# Patient Record
(demographics unavailable — no encounter records)

---

## 2025-02-10 NOTE — DATA REVIEWED
[FreeTextEntry1] : 2/10/2025 - BLE venous duplex Negative for acute DVT/SVT bilaterally. Bilateral GSV in thigh have been ablated. There are multiple varicose veins in the calves.   4/1/2024 - BLE venous duplex Negative for DVT/SVT bilaterally. Right - Reflux in CFV, GSV measuring 7.9mm at junction, 7.3 mm prox, reflux >0.5 seconds. VV measuring up to 6 mm in size.  Left - Reflux in CFV, GSV measuring 8.1mm at junction, 6.2 mm prox, reflux > 0.5 seconds. VV measuring up to 5.9 mm.

## 2025-02-10 NOTE — PHYSICAL EXAM
[2+] : left 2+ [Ankle Swelling On The Left] : moderate [Varicose Veins Of Lower Extremities] : bilaterally [Ankle Swelling On The Right] : mild [Calm] : calm [Ankle Swelling (On Exam)] : not present [] : not present [de-identified] : Well-appearing  [de-identified] : EOMI, anicteric  [de-identified] : soft, nt, nd  [de-identified] : motor and sensation intact in all four extremities  [de-identified] : no wounds on bilateral feet [de-identified] : A&Ox4

## 2025-02-10 NOTE — HISTORY OF PRESENT ILLNESS
[FreeTextEntry1] : STEVEN DEBRUNNER is a 62 year old male who presents for evaluation of bilateral varicose veins.   Referred by Dr. Bonifacio Kowalski.  Patient states that he has had varicose veins for many years. They are increasing in size and in number. Sometimes, they cause pain. Worse at the end of the day. Also reports leg swelling, heaviness, tightness. The right ankle swells more.  Personal history of DVT - None Family history of DVT - None Family history of venous insufficiency or varicose veins - Grandmother Current compression stocking usage - Yes but with persistent symptoms.  Retired  so stood a lot for work.   + PMH: atrial fibrillation, HTN + PSH: s/p ablations, hernia repair + FH: diabetes + SH: current smoker 1/2 ppd (10 years), 6-12 beer daily, marijuana < 1 joint a day + Aller: NKDA  PCP is Dr. Simba Perez. Cardiologist is Dr. Bonifacio Kowalski.   6/11/2024-left GSV RFA 6/18/2024-right GSV RFA 7/9/2024-left stab phlebectomy 7/16/2024-right stab phlebectomy  8/12/2024 - Pt presents for follow up. Doing well. Mild tenderness along left medial thigh. Denies leg swelling. Denies leg pain otherwise.  [de-identified] : 2/10/2025 - PT presents for follow up. Doing well. Denies leg pain.

## 2025-07-07 NOTE — DISCUSSION/SUMMARY
[FreeTextEntry1] : pAF: S/p redo ablation. Sinus tach in past, borderline elevated HR's. C/w diltiazem and Eliquis.     Strict avoidance of ETOH advised Encouraged smoking cessation Continue Eliquis 5mg BID  HTN- BP improved on recheck, continue meds. Alcohol cessation   Varicosities - Improving with vascular procedures   Consider ischemic testing- no real symptoms- needs to cut back on substance use   RV 6M  [EKG obtained to assist in diagnosis and management of assessed problem(s)] : EKG obtained to assist in diagnosis and management of assessed problem(s)

## 2025-07-07 NOTE — PHYSICAL EXAM
[Well Developed] : well developed [Well Nourished] : well nourished [No Acute Distress] : no acute distress [Normal Conjunctiva] : normal conjunctiva [Normal Venous Pressure] : normal venous pressure [No Carotid Bruit] : no carotid bruit [Normal S1, S2] : normal S1, S2 [No Murmur] : no murmur [No Rub] : no rub [No Gallop] : no gallop [Clear Lung Fields] : clear lung fields [No Respiratory Distress] : no respiratory distress  [Wheeze ____] : wheeze [unfilled] [Soft] : abdomen soft [Non Tender] : non-tender [No Masses/organomegaly] : no masses/organomegaly [Normal Bowel Sounds] : normal bowel sounds [Normal Gait] : normal gait [No Edema] : no edema [No Cyanosis] : no cyanosis [No Clubbing] : no clubbing [Venous varicosities] : venous varicosities [No Rash] : no rash [No Skin Lesions] : no skin lesions [Moves all extremities] : moves all extremities [No Focal Deficits] : no focal deficits [Normal Speech] : normal speech [Alert and Oriented] : alert and oriented [Normal memory] : normal memory

## 2025-07-07 NOTE — HISTORY OF PRESENT ILLNESS
[FreeTextEntry1] : PCP: Dr Edmund Perez  62M HTN, PAF s/p ablation x2 who presents for follow up   Pt feeling well overall  Occasional heart racing when nervous Remains on Eliquis without bleeding concerns  Pt otherwise feeling well  Patient denies chest pain, shortness of breath, palpitations, dizziness, lightheadedness, syncope.  Not exercising as much as he used to  Following with vascular surgery for venous insufficiency, s/p venous ablation and stab phlebectomy with good results  Still drinking ETOH regularly. ~6-8 beers per day Still smoking, trying to cut back. Currently smokes 1/2 pack per day  Father had an MI at 59, brother  from an MI at 59. Taking care of his mother, retired [employer] .  -----------  HISTORY:  Hospitalized in 2020 for CP, new onset AFib, attempted DCCV x 5 without success. Initially started on amio, d/c'ed due to prolonged QTc, subsequently d/c'ed on amio 200mg daily. Noted with PNA, UTI, treated for both with abx.  Also had been withdrawing from ETOH, he had been drinking heavily.  S/p PVI isolation and CTI 20 Noted to be in atrial flutter late 2020, went for DCCV 21, amio load Noted to be back in AF on 2021 Went for DCCV mid 2021, unsuccessful x 3 attempts. Now on another week of amio load S/p redo ablation 21  -----------  ECG: sinus rhythm at 95 TTE 2023:  1. Normal left ventricular cavity size. The left ventricular wall thickness is normal. The left ventricular systolic function is normal with an ejection fraction of 71 % by Hernandez's method of disks. There are no regional wall motion abnormalities seen. 2. There is normal left ventricular diastolic function. 3. Normal right ventricular cavity size, normal wall thickness and normal systolic function. The tricuspid annular plane systolic excursion (TAPSE) is 3.3 cm (normal >=1.7 cm). 4. The left atrium is moderately dilated. 5. The right atrium is moderately dilated.  Eliquis 5mg BID  Diltiazem 180mg  Losartan 50mg